# Patient Record
Sex: MALE | Race: WHITE | NOT HISPANIC OR LATINO | Employment: FULL TIME | URBAN - NONMETROPOLITAN AREA
[De-identification: names, ages, dates, MRNs, and addresses within clinical notes are randomized per-mention and may not be internally consistent; named-entity substitution may affect disease eponyms.]

---

## 2017-12-11 ENCOUNTER — TELEPHONE (OUTPATIENT)
Dept: PULMONOLOGY | Facility: CLINIC | Age: 57
End: 2017-12-11

## 2017-12-11 NOTE — TELEPHONE ENCOUNTER
Contacted the pt to schedule a referral apt for sleep, upon letting the pt know that it would be in early March, the Pt declined and stated that [facility] another [facility].

## 2018-03-14 ENCOUNTER — CONSULT (OUTPATIENT)
Dept: SLEEP MEDICINE | Facility: HOSPITAL | Age: 58
End: 2018-03-14

## 2018-03-14 VITALS
HEART RATE: 69 BPM | WEIGHT: 272 LBS | DIASTOLIC BLOOD PRESSURE: 64 MMHG | BODY MASS INDEX: 33.82 KG/M2 | OXYGEN SATURATION: 96 % | SYSTOLIC BLOOD PRESSURE: 118 MMHG | HEIGHT: 75 IN

## 2018-03-14 DIAGNOSIS — G47.33 OBSTRUCTIVE SLEEP APNEA, ADULT: ICD-10-CM

## 2018-03-14 DIAGNOSIS — R06.83 SNORING: Primary | ICD-10-CM

## 2018-03-14 DIAGNOSIS — E66.09 CLASS 1 OBESITY DUE TO EXCESS CALORIES WITHOUT SERIOUS COMORBIDITY WITH BODY MASS INDEX (BMI) OF 34.0 TO 34.9 IN ADULT: ICD-10-CM

## 2018-03-14 DIAGNOSIS — G89.4 CHRONIC PAIN SYNDROME: ICD-10-CM

## 2018-03-14 PROCEDURE — 99204 OFFICE O/P NEW MOD 45 MIN: CPT | Performed by: INTERNAL MEDICINE

## 2018-03-14 RX ORDER — ALLOPURINOL 300 MG/1
300 TABLET ORAL DAILY
COMMUNITY

## 2018-03-14 RX ORDER — PROBENECID AND COLCHICINE 500; .5 MG/1; MG/1
1 TABLET ORAL DAILY
COMMUNITY

## 2018-03-14 NOTE — PROGRESS NOTES
Subjective   Guillermo Hari Quiroz is a 57 y.o. male is being seen for consultation today at the request of Favian Benitez MD for the evaluation of snoring and nonrestorative sleep.    History of Present Illness  Patient states he's felt that we never slept well.  He is up frequently at night than awakens and tosses and turns a lot at night.  His wife sleeps in a separate room because he is so disrupted.  He is occasional snoring.  He has awakened with dyspnea.  He says he's not rested in the morning.  He denies morning headaches.  He will fall asleep if sitting quietly during the day.  He denies any problems while driving.  He has awakened with a dry mouth but denies awakening with a sore throat or choking.  He denies ever breaking his nose.    He has a history of reflux symptoms and is on over-the-counter medications.  He denies hypnagogic hallucinations but has noted sleep paralysis on only couple of occasions.  He has a history of chronic pain with back pain and also has history of gout he has occasional problems at night.  He had a history of Guillain-Barré in 2007 and still has some numbness in his feet.  He is unaware of kicking and jerking his legs at night.    He goes to bed between 10 and 11.  He will fall asleep in 5 minutes.  He awakens 3-4 times during the night.  He sometimes takes him 30 minutes to 40 minutes to get back to sleep.  He thinks he gets about 6 hours of sleep and occasionally feels groggy in the morning.  He denies any history of hypertension diabetes or coronary artery disease.  Allergies   Allergen Reactions   • Demerol [Meperidine] Mental Status Change     CONVULSIONS          Current Outpatient Prescriptions:   •  allopurinol (ZYLOPRIM) 300 MG tablet, Take 300 mg by mouth Daily., Disp: , Rfl:   •  colchicine-probenecid (COL-BENEMID) 0.5-500 MG tablet, Take 1 tablet by mouth Daily., Disp: , Rfl:     Smoking status: Never Smoker                                                          "     Smokeless tobacco: Never Used                           History   Alcohol Use   • 1.2 oz/week   • 1 Cans of beer, 1 Glasses of wine per week       Caffeine: 12 ounces of tea and 48 ounces of cola each day    Past Medical History:   Diagnosis Date   • Gout    • Guillain-Lavinia disease        Past Surgical History:   Procedure Laterality Date   • GASTRIC SLEEVE LAPAROSCOPIC     • KNEE SURGERY         Family History   Problem Relation Age of Onset   • Hypertension Mother    • Stroke Mother    • Obesity Mother    • Thyroid disease Sister    • COPD Brother        The following portions of the patient's history were reviewed and updated as appropriate: allergies, current medications, past family history, past medical history, past social history, past surgical history and problem list.    Review of Systems   Constitutional: Negative.    HENT: Negative.    Eyes: Negative.    Respiratory: Negative.    Cardiovascular: Negative.    Gastrointestinal: Negative.    Endocrine: Negative.    Genitourinary: Positive for frequency.   Musculoskeletal: Positive for arthralgias.   Skin: Negative.    Allergic/Immunologic: Negative.    Neurological: Positive for numbness.   Hematological: Negative.    Psychiatric/Behavioral: Negative.     [place] scores 12/24    Objective     /64   Pulse 69   Ht 190.5 cm (75\")   Wt 123 kg (272 lb)   SpO2 96%   BMI 34.00 kg/m²      Physical Exam   Constitutional: He is oriented to person, place, and time. He appears well-developed and well-nourished.   He is obese.   HENT:   Head: Normocephalic and atraumatic.   His nasal airway narrowing with nasal septal deviation the right and Mallampati class III anatomy.   Eyes: EOM are normal. Pupils are equal, round, and reactive to light.   Neck: Normal range of motion. Neck supple.   Cardiovascular: Normal rate, regular rhythm and normal heart sounds.    Pulmonary/Chest: Effort normal and breath sounds normal.   Abdominal: Soft. Bowel sounds are " normal.   Musculoskeletal: Normal range of motion. He exhibits no edema.   Neurological: He is alert and oriented to person, place, and time.   Skin: Skin is warm and dry.   Psychiatric: He has a normal mood and affect. His behavior is normal.         Assessment/Plan   Guillermo was seen today for sleeping problem.    Diagnoses and all orders for this visit:    Snoring  -     Polysomnography 4 or More Parameters; Future    Obstructive sleep apnea, adult  -     Polysomnography 4 or More Parameters; Future    Class 1 obesity due to excess calories without serious comorbidity with body mass index (BMI) of 34.0 to 34.9 in adult    Chronic pain syndrome     patient presents with a history as noted above.  He has a history of slight snoring at least.  He also has a history of chronic pain.  He has very disturbed sleep.  We will plan to proceed with evaluation with apologies polysomnogram.  I've discussed possible therapies for sleep apnea including CPAP, weight control, oral appliances, and surgery.  Also discussed possible complications of long-term untreated obstructive sleep apnea.  We have discussed possible medical therapy for periodic limb movement disorder as well.  He is to return in roughly 2 weeks after his study and reassess situation.  We've encouraged him to lose weight.  We've  encouraged him to avoid alcohol and sedatives close to bedtime.  He is encouraged practice lateral position sleep.         Momo Valenzuela MD [facility]  Sleep Medicine  Pulmonary and Critical Care Medicine

## 2018-03-20 ENCOUNTER — HOSPITAL ENCOUNTER (OUTPATIENT)
Dept: SLEEP MEDICINE | Facility: HOSPITAL | Age: 58
Discharge: HOME | End: 2018-03-20
Attending: INTERNAL MEDICINE | Admitting: INTERNAL MEDICINE

## 2018-03-20 VITALS
HEIGHT: 75 IN | HEART RATE: 67 BPM | OXYGEN SATURATION: 96 % | BODY MASS INDEX: 34.21 KG/M2 | DIASTOLIC BLOOD PRESSURE: 77 MMHG | SYSTOLIC BLOOD PRESSURE: 131 MMHG | WEIGHT: 275.13 LBS

## 2018-03-20 DIAGNOSIS — R06.83 SNORING: ICD-10-CM

## 2018-03-20 DIAGNOSIS — G47.33 OBSTRUCTIVE SLEEP APNEA, ADULT: ICD-10-CM

## 2018-03-20 PROCEDURE — 95810 POLYSOM 6/> YRS 4/> PARAM: CPT | Performed by: INTERNAL MEDICINE

## 2018-03-20 PROCEDURE — 95810 POLYSOM 6/> YRS 4/> PARAM: CPT

## 2018-03-21 DIAGNOSIS — G47.33 OBSTRUCTIVE SLEEP APNEA, ADULT: Primary | ICD-10-CM

## 2018-03-23 NOTE — PROGRESS NOTES
Patient called back and would rather come in for a follow up appointment first before choosing to be set up on pap therapy. 3/23/2018

## 2018-04-05 ENCOUNTER — OFFICE VISIT (OUTPATIENT)
Dept: SLEEP MEDICINE | Facility: HOSPITAL | Age: 58
End: 2018-04-05

## 2018-04-05 VITALS
WEIGHT: 276 LBS | BODY MASS INDEX: 34.32 KG/M2 | OXYGEN SATURATION: 96 % | HEIGHT: 75 IN | HEART RATE: 70 BPM | DIASTOLIC BLOOD PRESSURE: 78 MMHG | SYSTOLIC BLOOD PRESSURE: 148 MMHG

## 2018-04-05 DIAGNOSIS — G47.33 OBSTRUCTIVE SLEEP APNEA, ADULT: Primary | ICD-10-CM

## 2018-04-05 DIAGNOSIS — E66.09 CLASS 1 OBESITY DUE TO EXCESS CALORIES WITHOUT SERIOUS COMORBIDITY WITH BODY MASS INDEX (BMI) OF 34.0 TO 34.9 IN ADULT: ICD-10-CM

## 2018-04-05 DIAGNOSIS — G47.61 PLMD (PERIODIC LIMB MOVEMENT DISORDER): ICD-10-CM

## 2018-04-05 PROCEDURE — 99214 OFFICE O/P EST MOD 30 MIN: CPT | Performed by: INTERNAL MEDICINE

## 2018-04-05 RX ORDER — GABAPENTIN 300 MG/1
300 CAPSULE ORAL DAILY
Qty: 30 CAPSULE | Refills: 2 | Status: SHIPPED | OUTPATIENT
Start: 2018-04-05

## 2018-04-05 NOTE — PATIENT INSTRUCTIONS
Restless Legs Syndrome  Restless legs syndrome is a condition that causes uncomfortable feelings or sensations in the legs, especially while sitting or lying down. The sensations usually cause an overwhelming urge to move the legs. The arms can also sometimes be affected.  The condition can range from mild to severe. The symptoms often interfere with a person's ability to sleep.  What are the causes?  The cause of this condition is not known.  What increases the risk?  This condition is more likely to develop in:  · People who are older than age 50.  · Pregnant women. In general, restless legs syndrome is more common in women than in men.  · People who have a family history of the condition.  · People who have certain medical conditions, such as iron deficiency, kidney disease, Parkinson disease, or nerve damage.  · People who take certain medicines, such as medicines for high blood pressure, nausea, colds, allergies, depression, and some heart conditions.  What are the signs or symptoms?  The main symptom of this condition is uncomfortable sensations in the legs. These sensations may be:  · Described as pulling, tingling, prickling, throbbing, crawling, or burning.  · Worse while you are sitting or lying down.  · Worse during periods of rest or inactivity.  · Worse at night, often interfering with your sleep.  · Accompanied by a very strong urge to move your legs.  · Temporarily relieved by movement of your legs.  The sensations usually affect both sides of the body. The arms can also be affected, but this is rare. People who have this condition often have tiredness during the day because of their lack of sleep at night.  How is this diagnosed?  This condition may be diagnosed based on your description of the symptoms. You may also have tests, including blood tests, to check for other conditions that may lead to your symptoms. In some cases, you may be asked to spend some time in a sleep lab so your sleeping can be  monitored.  How is this treated?  Treatment for this condition is focused on managing the symptoms. Treatment may include:  · Self-help and lifestyle changes.  · Medicines.  Follow these instructions at home:  · Take medicines only as directed by your health care provider.  · Try these methods to get temporary relief from the uncomfortable sensations:  ¨ Massage your legs.  ¨ Walk or stretch.  ¨ Take a cold or hot bath.  · Practice good sleep habits. For example, go to bed and get up at the same time every day.  · Exercise regularly.  · Practice ways of relaxing, such as yoga or meditation.  · Avoid caffeine and alcohol.  · Do not use any tobacco products, including cigarettes, chewing tobacco, or electronic cigarettes. If you need help quitting, ask your health care provider.  · Keep all follow-up visits as directed by your health care provider. This is important.  Contact a health care provider if:  Your symptoms do not improve with treatment, or they get worse.  This information is not intended to replace advice given to you by your health care provider. Make sure you discuss any questions you have with your health care provider.  Document Released: 12/08/2003 Document Revised: 05/25/2017 Document Reviewed: 12/14/2015  [employer] Interactive Patient Education © 2017 [employer]  Sleep Apnea  Sleep apnea is a condition in which breathing pauses or becomes shallow during sleep. Episodes of sleep apnea usually last 10 seconds or longer, and they may occur as many as 20 times an hour. Sleep apnea disrupts your sleep and keeps your body from getting the rest that it needs. This condition can increase your risk of certain health problems, including:  · Heart attack.  · Stroke.  · Obesity.  · Diabetes.  · Heart failure.  · Irregular heartbeat.  There are three kinds of sleep apnea:  · Obstructive sleep apnea. This kind is caused by a blocked or collapsed airway.  · Central sleep apnea. This kind happens when the part of  the brain that controls breathing does not send the correct signals to the muscles that control breathing.  · Mixed sleep apnea. This is a combination of obstructive and central sleep apnea.  What are the causes?  The most common cause of this condition is a collapsed or blocked airway. An airway can collapse or become blocked if:  · Your throat muscles are abnormally relaxed.  · Your tongue and tonsils are larger than normal.  · You are overweight.  · Your airway is smaller than normal.  What increases the risk?  This condition is more likely to develop in people who:  · Are overweight.  · Smoke.  · Have a smaller than normal airway.  · Are elderly.  · Are male.  · Drink alcohol.  · Take sedatives or tranquilizers.  · Have a family history of sleep apnea.  What are the signs or symptoms?  Symptoms of this condition include:  · Trouble staying asleep.  · Daytime sleepiness and tiredness.  · Irritability.  · Loud snoring.  · Morning headaches.  · Trouble concentrating.  · Forgetfulness.  · Decreased interest in sex.  · Unexplained sleepiness.  · Mood swings.  · Personality changes.  · Feelings of depression.  · Waking up often during the night to urinate.  · Dry mouth.  · Sore throat.  How is this diagnosed?  This condition may be diagnosed with:  · A medical history.  · A physical exam.  · A series of tests that are done while you are sleeping (sleep study). These tests are usually done in a sleep lab, but they may also be done at home.  How is this treated?  Treatment for this condition aims to restore normal breathing and to ease symptoms during sleep. It may involve managing health issues that can affect breathing, such as high blood pressure or obesity. Treatment may include:  · Sleeping on your side.  · Using a decongestant if you have nasal congestion.  · Avoiding the use of depressants, including alcohol, sedatives, and narcotics.  · Losing weight if you are overweight.  · Making changes to your  diet.  · Quitting smoking.  · Using a device to open your airway while you sleep, such as:  ¨ An oral appliance. This is a custom-made mouthpiece that shifts your lower jaw forward.  ¨ A continuous positive airway pressure (CPAP) device. This device delivers oxygen to your airway through a mask.  ¨ A nasal expiratory positive airway pressure (EPAP) device. This device has valves that you put into each nostril.  ¨ A bi-level positive airway pressure (BPAP) device. This device delivers oxygen to your airway through a mask.  · Surgery if other treatments do not work. During surgery, excess tissue is removed to create a wider airway.  It is important to get treatment for sleep apnea. Without treatment, this condition can lead to:  · High blood pressure.  · Coronary artery disease.  · (Men) An inability to achieve or maintain an erection (impotence).  · Reduced thinking abilities.  Follow these instructions at home:  · Make any lifestyle changes that your health care provider recommends.  · Eat a healthy, well-balanced diet.  · Take over-the-counter and prescription medicines only as told by your health care provider.  · Avoid using depressants, including alcohol, sedatives, and narcotics.  · Take steps to lose weight if you are overweight.  · If you were given a device to open your airway while you sleep, use it only as told by your health care provider.  · Do not use any tobacco products, such as cigarettes, chewing tobacco, and e-cigarettes. If you need help quitting, ask your health care provider.  · Keep all follow-up visits as told by your health care provider. This is important.  Contact a health care provider if:  · The device that you received to open your airway during sleep is uncomfortable or does not seem to be working.  · Your symptoms do not improve.  · Your symptoms get worse.  Get help right away if:  · You develop chest pain.  · You develop shortness of breath.  · You develop discomfort in your back,  arms, or stomach.  · You have trouble speaking.  · You have weakness on one side of your body.  · You have drooping in your face.  These symptoms may represent a serious problem that is an emergency. Do not wait to see if the symptoms will go away. Get medical help right away. Call your local emergency services (911 in the [place] Do not drive yourself to the hospital.  This information is not intended to replace advice given to you by your health care provider. Make sure you discuss any questions you have with your health care provider.  Document Released: 12/08/2003 Document Revised: 08/13/2017 Document Reviewed: 09/26/2016  [employer] Interactive Patient Education © 2017 [employer]

## 2018-04-05 NOTE — PROGRESS NOTES
Subjective   Guillermo Quiroz is a 58 y.o. male is here today for follow-up.  He is seen follow-up of snoring and nonrestorative sleep.  His primary care physician is Dr. Benitez.    History of Present Illness  Patient was seen initially on March 14.  He had a history of snoring and nonrestorative sleep chronic pain obesity.  He had a polysomnogram on March 20.  He thought his study night was fairly usual.  He says he awakens frequently at home.  He is up at least once or twice to go the bathroom.  He has problems getting comfortable at night and usually sleeps on his right side.  He denies being told he had kicking.  He denies any other changes in health status.  Past Medical History:   Diagnosis Date   • Gout    • Guillain-Cameron disease        Past Surgical History:   Procedure Laterality Date   • GASTRIC SLEEVE LAPAROSCOPIC     • KNEE SURGERY             Current Outpatient Prescriptions:   •  allopurinol (ZYLOPRIM) 300 MG tablet, Take 300 mg by mouth Daily., Disp: , Rfl:   •  colchicine-probenecid (COL-BENEMID) 0.5-500 MG tablet, Take 1 tablet by mouth Daily., Disp: , Rfl:   •  gabapentin (NEURONTIN) 300 MG capsule, Take 1 capsule by mouth Daily. One hour before bedtime, Disp: 30 capsule, Rfl: 2    Allergies   Allergen Reactions   • Demerol [Meperidine] Mental Status Change     CONVULSIONS       The following portions of the patient's history were reviewed and updated as appropriate: allergies, current medications and problem list.    Review of Systems   Constitutional: Negative.    HENT: Negative.    Eyes: Negative.    Respiratory: Negative.    Cardiovascular: Negative.    Gastrointestinal: Negative.    Endocrine: Negative.    Genitourinary: Positive for frequency.   Musculoskeletal: Positive for arthralgias.   Skin: Negative.    Allergic/Immunologic: Negative.    Neurological: Positive for numbness.   Hematological: Negative.    Psychiatric/Behavioral: Negative.     [place] scores 13/24    Objective     BP  "148/78   Pulse 70   Ht 190.5 cm (75\")   Wt 125 kg (276 lb)   SpO2 96%   BMI 34.50 kg/m²     Physical Exam   Constitutional: He is oriented to person, place, and time. He appears well-developed and well-nourished.   He is obese.   HENT:   Head: Normocephalic and atraumatic.   He has nasal airway narrowing with some nasal septal deviation the right and  mallampati class III anatomy.   Eyes: EOM are normal. Pupils are equal, round, and reactive to light.   Neck: Normal range of motion. Neck supple.   Cardiovascular: Normal rate, regular rhythm and normal heart sounds.    He has occasional ectopic beats   Pulmonary/Chest: Effort normal and breath sounds normal.   Abdominal: Soft. Bowel sounds are normal.   Musculoskeletal: Normal range of motion. He exhibits no edema.   Neurological: He is alert and oriented to person, place, and time.   Skin: Skin is warm and dry.   Psychiatric: He has a normal mood and affect. His behavior is normal.    polysomnogram on March 20 showed overall an AHI of 10.6 but he showed significant positional effect.  His supine index was 39.8.  On his left side 1.3 right side 5.0 in prone 5.4.  He also had an elevated periodic limb movement with arousal index of 9.9.      Assessment/Plan   Guillerom was seen today for follow-up.    Diagnoses and all orders for this visit:    Obstructive sleep apnea, adult  -      Mandibular Advancement Device (custom fabricated)  -     Ambulatory Referral to Dentistry    PLMD (periodic limb movement disorder)  -     gabapentin (NEURONTIN) 300 MG capsule; Take 1 capsule by mouth Daily. One hour before bedtime    Class 1 obesity due to excess calories without serious comorbidity with body mass index (BMI) of 34.0 to 34.9 in adult    Patient does have overall mild obstructive sleep apnea.  He also has periodic limb and disorder.  We've discussed possible therapy for his sleep apnea, including CPAP, weight control, lateral position sleep, oral appliances, and " surgery.  He wishes to work on weight control and lateral position sleep and talk to his dentist about an oral appliance.  I think this should be adequate therapy.  He had a very mild index in lateral position.  He is also willing to try medications for his leg movements.  We will start gabapentin 300 mg 1 hour before bedtime.  We will have him return in 3 months.  he is to contact us earlier symptoms worsen.             Momo Valenzuela MD [facility]  Sleep Medicine  Pulmonary and Critical Care Medicine      04/05/18  4:25 PM